# Patient Record
Sex: FEMALE | Race: WHITE
[De-identification: names, ages, dates, MRNs, and addresses within clinical notes are randomized per-mention and may not be internally consistent; named-entity substitution may affect disease eponyms.]

---

## 2021-01-19 ENCOUNTER — HOSPITAL ENCOUNTER (OUTPATIENT)
Dept: HOSPITAL 76 - DI.S | Age: 66
Discharge: HOME | End: 2021-01-19
Attending: PHYSICIAN ASSISTANT
Payer: MEDICARE

## 2021-01-19 DIAGNOSIS — M19.042: Primary | ICD-10-CM

## 2021-01-19 NOTE — XRAY REPORT
PROCEDURE:  Finger(s) LT

 

INDICATIONS:  LEFT FINGER PAIN

 

TECHNIQUE:  AP hand, 3 views of the third finger(s) acquired.  

 

COMPARISON:  None

 

FINDINGS:  

 

Bones:  No fractures or dislocations.  No suspicious bony lesions.  Note is made of mild narrowing of
 the distal inner phalangeal joint consistent with degenerative osteoarthritis but no erosive arthrit
is is found.

 

Soft tissues:  No suspicious soft tissue calcifications.  

 

IMPRESSION:  

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 Mild soft tissue swelling al
so may be present over the middle interphalangeal joint, potentially a manifestation of trauma or inf
lammatory change related to mild osteoarthritis in that area also.

 

Reviewed by: Reza Mosher MD on 1/19/2021 4:35 PM PST

Approved by: Reza Mosher MD on 1/19/2021 4:35 PM PST

 

 

Station ID:  [id number]